# Patient Record
Sex: FEMALE | Race: WHITE | HISPANIC OR LATINO | Employment: FULL TIME
[De-identification: names, ages, dates, MRNs, and addresses within clinical notes are randomized per-mention and may not be internally consistent; named-entity substitution may affect disease eponyms.]

---

## 2017-07-15 ENCOUNTER — HEALTH MAINTENANCE LETTER (OUTPATIENT)
Age: 32
End: 2017-07-15

## 2021-05-25 ENCOUNTER — HOSPITAL ENCOUNTER (EMERGENCY)
Facility: CLINIC | Age: 36
Discharge: HOME | End: 2021-05-25
Attending: EMERGENCY MEDICINE | Admitting: EMERGENCY MEDICINE
Payer: MEDICARE

## 2021-05-25 VITALS
HEART RATE: 113 BPM | OXYGEN SATURATION: 96 % | BODY MASS INDEX: 44.15 KG/M2 | SYSTOLIC BLOOD PRESSURE: 125 MMHG | HEIGHT: 65 IN | DIASTOLIC BLOOD PRESSURE: 85 MMHG | WEIGHT: 265 LBS | TEMPERATURE: 98.9 F | RESPIRATION RATE: 16 BRPM

## 2021-05-25 DIAGNOSIS — F41.9 ANXIETY: ICD-10-CM

## 2021-05-25 DIAGNOSIS — R00.0 SINUS TACHYCARDIA: ICD-10-CM

## 2021-05-25 LAB
ANION GAP SERPL CALCULATED.3IONS-SCNC: 4 MMOL/L (ref 3–14)
BASOPHILS # BLD AUTO: 0.1 10E9/L (ref 0–0.2)
BASOPHILS NFR BLD AUTO: 0.4 %
BUN SERPL-MCNC: 12 MG/DL (ref 7–30)
CALCIUM SERPL-MCNC: 9.3 MG/DL (ref 8.5–10.1)
CHLORIDE SERPL-SCNC: 110 MMOL/L (ref 94–109)
CO2 SERPL-SCNC: 25 MMOL/L (ref 20–32)
CREAT SERPL-MCNC: 0.7 MG/DL (ref 0.52–1.04)
D DIMER PPP FEU-MCNC: <0.3 UG/ML FEU (ref 0–0.5)
DIFFERENTIAL METHOD BLD: ABNORMAL
EOSINOPHIL # BLD AUTO: 0.2 10E9/L (ref 0–0.7)
EOSINOPHIL NFR BLD AUTO: 1.3 %
ERYTHROCYTE [DISTWIDTH] IN BLOOD BY AUTOMATED COUNT: 14.2 % (ref 10–15)
GFR SERPL CREATININE-BSD FRML MDRD: >90 ML/MIN/{1.73_M2}
GLUCOSE SERPL-MCNC: 114 MG/DL (ref 70–99)
HCG SERPL QL: NEGATIVE
HCT VFR BLD AUTO: 41.5 % (ref 35–47)
HGB BLD-MCNC: 13.2 G/DL (ref 11.7–15.7)
IMM GRANULOCYTES # BLD: 0.1 10E9/L (ref 0–0.4)
IMM GRANULOCYTES NFR BLD: 0.4 %
INTERPRETATION ECG - MUSE: NORMAL
LYMPHOCYTES # BLD AUTO: 3.1 10E9/L (ref 0.8–5.3)
LYMPHOCYTES NFR BLD AUTO: 24.3 %
MAGNESIUM SERPL-MCNC: 2.2 MG/DL (ref 1.6–2.3)
MCH RBC QN AUTO: 23.9 PG (ref 26.5–33)
MCHC RBC AUTO-ENTMCNC: 31.8 G/DL (ref 31.5–36.5)
MCV RBC AUTO: 75 FL (ref 78–100)
MONOCYTES # BLD AUTO: 0.7 10E9/L (ref 0–1.3)
MONOCYTES NFR BLD AUTO: 5.3 %
NEUTROPHILS # BLD AUTO: 8.7 10E9/L (ref 1.6–8.3)
NEUTROPHILS NFR BLD AUTO: 68.3 %
NRBC # BLD AUTO: 0 10*3/UL
NRBC BLD AUTO-RTO: 0 /100
PLATELET # BLD AUTO: 409 10E9/L (ref 150–450)
POTASSIUM SERPL-SCNC: 4.2 MMOL/L (ref 3.4–5.3)
RBC # BLD AUTO: 5.52 10E12/L (ref 3.8–5.2)
SODIUM SERPL-SCNC: 139 MMOL/L (ref 133–144)
TROPONIN I SERPL-MCNC: <0.015 UG/L (ref 0–0.04)
TSH SERPL DL<=0.005 MIU/L-ACNC: 1.24 MU/L (ref 0.4–4)
WBC # BLD AUTO: 12.7 10E9/L (ref 4–11)

## 2021-05-25 PROCEDURE — 84443 ASSAY THYROID STIM HORMONE: CPT | Performed by: EMERGENCY MEDICINE

## 2021-05-25 PROCEDURE — 93005 ELECTROCARDIOGRAM TRACING: CPT

## 2021-05-25 PROCEDURE — 83735 ASSAY OF MAGNESIUM: CPT | Performed by: EMERGENCY MEDICINE

## 2021-05-25 PROCEDURE — 99285 EMERGENCY DEPT VISIT HI MDM: CPT | Mod: 25

## 2021-05-25 PROCEDURE — 84484 ASSAY OF TROPONIN QUANT: CPT | Performed by: EMERGENCY MEDICINE

## 2021-05-25 PROCEDURE — 96360 HYDRATION IV INFUSION INIT: CPT

## 2021-05-25 PROCEDURE — 85379 FIBRIN DEGRADATION QUANT: CPT | Performed by: EMERGENCY MEDICINE

## 2021-05-25 PROCEDURE — 96361 HYDRATE IV INFUSION ADD-ON: CPT

## 2021-05-25 PROCEDURE — 258N000003 HC RX IP 258 OP 636: Performed by: EMERGENCY MEDICINE

## 2021-05-25 PROCEDURE — 84703 CHORIONIC GONADOTROPIN ASSAY: CPT | Performed by: EMERGENCY MEDICINE

## 2021-05-25 PROCEDURE — 85025 COMPLETE CBC W/AUTO DIFF WBC: CPT | Performed by: EMERGENCY MEDICINE

## 2021-05-25 PROCEDURE — 80048 BASIC METABOLIC PNL TOTAL CA: CPT | Performed by: EMERGENCY MEDICINE

## 2021-05-25 PROCEDURE — 250N000013 HC RX MED GY IP 250 OP 250 PS 637: Performed by: EMERGENCY MEDICINE

## 2021-05-25 RX ORDER — HYDROXYZINE HYDROCHLORIDE 25 MG/1
25 TABLET, FILM COATED ORAL ONCE
Status: DISCONTINUED | OUTPATIENT
Start: 2021-05-25 | End: 2021-05-25 | Stop reason: HOSPADM

## 2021-05-25 RX ADMIN — SODIUM CHLORIDE 1000 ML: 9 INJECTION, SOLUTION INTRAVENOUS at 12:01

## 2021-05-25 ASSESSMENT — MIFFLIN-ST. JEOR: SCORE: 1907.91

## 2021-05-25 ASSESSMENT — ENCOUNTER SYMPTOMS
NERVOUS/ANXIOUS: 1
PALPITATIONS: 1
SHORTNESS OF BREATH: 0

## 2021-05-25 NOTE — ED PROVIDER NOTES
"  History   Chief Complaint:  \"Spasms\"    The history is provided by the patient.      Linda Park is a 35 year old female with history of anxiety who presents with spasms. Linda developed a tight sensation and \"spasms\" in her left upper and lower extremities this morning. She felt anxious with palpitations. She describes the spasms as \"twitchig\" which has resolved by arrival. She believes she was hyperventilating but did not feel short of breath nor did she have chest pain. Her symptoms were similar, but more severe, to those she has had in the past with panic attacks. She does have a psychiatrist and a counselor and does not feel she has had worsening stressors or depression that would have led to her symptoms today. She denies SI, ingestion, trauma, travel, or recent surgery. Linda denies family history of medical problems.    Review of Systems   Respiratory: Negative for shortness of breath.    Cardiovascular: Positive for palpitations. Negative for chest pain.   Neurological: Negative for weakness.        Positive for tightness and spasms, left arm and left leg.   Psychiatric/Behavioral: Negative for suicidal ideas. The patient is nervous/anxious.    All other systems reviewed and are negative.    Allergies:  The patient has no known allergies.     Medications:  Zyrtec  Hydroxyzine  Fluvoxamine  Naproxen  Birth control patch, unknown    Past Medical History:    Anxiety  ADD  Back pain  Obesity  Learning disability     Past Surgical History:    There is no past surgical history to display for this patient.     Family History:    Patient is adopted. Family history is unknown.    Social History:  Works at [employer] but is on [employer].  Denies drinking alcohol or smoking.  Denies use of drugs including no marijuana.  Presents alone via EMS.  Vaccinated for COVID-19    Physical Exam     Patient Vitals for the past 24 hrs:   BP Temp Temp src Pulse Resp SpO2 Height Weight   05/25/21 1424 125/85 -- -- " "113 16 96 % -- --   05/25/21 1200 (!) 133/95 -- -- 101 16 96 % -- --   05/25/21 1114 (!) 134/93 98.9  F (37.2  C) Oral 100 16 97 % 1.651 m (5' 5\") 120.2 kg (265 lb)       Physical Exam  General: Well-developed and well-nourished. Well appearing young  woman. Cooperative.  Head:  Atraumatic.  Eyes:  Conjunctivae, lids, and sclerae are normal.  Neck:  Supple. Normal range of motion.  CV:  Tachycardic rate and regular rhythm. Normal heart sounds with no murmurs, rubs, or gallops detected.  Resp:  No respiratory distress. Clear to auscultation bilaterally without decreased breath sounds, wheezing, rales, or rhonchi.  GI:  Soft. Non-distended. Non-tender.    MS:  Normal ROM. No bilateral lower extremity edema or calf tenderness.  Skin:  Warm. Non-diaphoretic. No pallor.  Neuro: Awake. A&Ox3. Normal strength.  Normal muscle tone.  No spasm, tremor, or twitching.  Psych:  Flat mood and affect. Normal speech.  No suicidal thought content.  Not responding to internal stimuli.  Vitals reviewed.    Emergency Department Course   EKG  Time: 1221  Rate 97 bpm. AK interval 152. QRS duration 74. QT/QTc 360/457.   Normal sinus rhythm  Junctional ST depression, probably normal  Borderline ECG   No acute ST changes.  No significant change as compared to prior, dated 1/1/16.    Laboratory:  CBC: WBC: 12.7 (H), HGB: 13.2, PLT: 409  BMP: Glucose 114 (H), Chloride: 110 (H), o/w WNL (Creatinine: 0.70)  Troponin (Collected 1159): <0.015  D-dimer: <0.3  TSH with Free T4 Reflex: 1.24  Magnesium: 2.2  HCG Qualitative Urine: negative     Emergency Department Course:    Reviewed:  I reviewed nursing notes, vitals, past medical history, and Care Everywhere.    Assessments:  1145 I obtained history and examined the patient as noted above.   1311 I rechecked the patient and explained findings. She declines transfer to [facility] and is feeling improved with no concerns.    Interventions:  1201 NS 1000 mL IV  Declined " "hydroxyzine.    Disposition:  The patient was discharged home.   Impression & Plan   Medical Decision Making:  Linad is a 35-year-old woman who tells me she had a tight feeling in her left upper and lower extremity with palpitations and anxiety this morning which prompted her visit.  She felt her left side was \"spasming\" which she describes as a twitching sensation.  She feels she was hyperventilating but did not feel short of breath.  She denies chest pain or other concerns.  The time of interview she is feeling much improved aside from some residual anxiety.  She denies suicidal thought content.  She appears well on exam with no focal findings.  She has no involuntary movements including spasm or tremor.  She is tachycardic which may be related to her anxiety.  However, she declined hydroxyzine.  Her D-dimer is negative essentially ruling out pulmonary embolism and EKG reveals no concerning features.  Troponin is undetectable and, particularly without chest pain or shortness of breath, a cardiac etiology for her symptoms is unlikely.  There is no anemia, kidney injury, or significant electrolyte derangements to explain her symptoms.  TSH is normal.  Leukocytosis to 12.7 is of uncertain etiology.  Linda was given IV fluids while undergoing her work-up but required no further interventions.  Multiple times I offered to send her to the [facility] unit as symptoms today are most consistent with a panic attack.  However, she declined this offer and preferred to go home as she was feeling much better.  I think this is a reasonable course of action as she has no current complaints, has unremarkable exam and work-up, and has no suicidal thought content.  I recommended she continue her home medications for anxiety and follow with her mental health providers.  She understands she should return if she is having worsening symptoms or if she changes her mind regarding a mental health assessment.  All questions " answered.    Diagnosis:    ICD-10-CM    1. Anxiety  F41.9    2. Sinus tachycardia  R00.0        Scribe Disclosure:  I, Juan Rodriguez, am serving as a scribe at 11:28 AM on 5/25/2021 to document services personally performed by Eden Thompson MD based on my observations and the provider's statements to me.      Eden Thompson MD  05/26/21 [id number]

## 2021-05-25 NOTE — ED NOTES
Patient states that she is safe at home. States that she is still not interested in [facility] and will return to the ED if she needs to.

## 2021-05-25 NOTE — ED TRIAGE NOTES
Per EMS: Patient lives alone with her cat. States that yesterday she started having left leg tingling and anxiety. Did not eat yesterday but has been drinking pop. EMS stated that she calms when he spoke with her but as soon as he stopped, she started trembling and shaking again. Received 2.5 of droperidol in route.

## 2021-05-25 NOTE — ED NOTES
Bed: ED18  Expected date:   Expected time:   Means of arrival:   Comments:  [facility] – [place] - [phone] - 35 F anxiety eta 1108

## 2021-05-26 ASSESSMENT — ENCOUNTER SYMPTOMS: WEAKNESS: 0

## 2022-07-30 ENCOUNTER — NURSE TRIAGE (OUTPATIENT)
Dept: NURSING | Facility: CLINIC | Age: 37
End: 2022-07-30

## 2022-07-30 NOTE — TELEPHONE ENCOUNTER
"Incoming red transfer call     Linda states her symptoms started mid-morning yesterday. \"I'm downing a lot of water. I still feel like crap.\" Current symptoms include headache (rating pain 6-7/10), diarrhea 2-3 times overnight, and generally not feeling well. Linda has taken Advil, but now with consistency and still continues to have a headache. Has been urinating every 2-3 hours and urinated this morning. Patient denies severe abdominal pain, blood in the stool, or fever (doesn't feel flushed/ warm/ chilled). Otherwise, Linda is awake, alert, and responding appropriately.     Triage guidelines recommend home care at this time. FNA RN reviewed home care advice and advised to       RN advised to call back with any changes, worsening of symptoms, and questions or concerns. Patient verbalized understanding of and agreement with plan and had no further questions.     Reason for Disposition    MILD-MODERATE diarrhea (e.g., 1-6 times / day more than normal)    Additional Information    Negative: Shock suspected (e.g., cold/pale/clammy skin, too weak to stand, low BP, rapid pulse)    Negative: Difficult to awaken or acting confused (e.g., disoriented, slurred speech)    Negative: Sounds like a life-threatening emergency to the triager    Negative: [1] SEVERE abdominal pain (e.g., excruciating) AND [2] present > 1 hour    Negative: [1] SEVERE abdominal pain AND [2] age > 60    Negative: [1] Blood in the stool AND [2] moderate or large amount of blood    Negative: Black or tarry bowel movements  (Exception: chronic-unchanged  black-grey bowel movements AND is taking iron pills or Pepto-bismol)    Negative: [1] Drinking very little AND [2] dehydration suspected (e.g., no urine > 12 hours, very dry mouth, very lightheaded)    Negative: Patient sounds very sick or weak to the triager    Negative: [1] SEVERE diarrhea (e.g., 7 or more times / day more than normal) AND [2] present > 24 hours (1 day)    Negative: [1] " MODERATE diarrhea (e.g., 4-6 times / day more than normal) AND [2] present > 48 hours (2 days)    Negative: [1] MODERATE diarrhea (e.g., 4-6 times / day more than normal) AND [2] age > 70 years    Negative: Fever > 101 F (38.3 C)    Negative: Fever present > 3 days (72 hours)    Negative: Abdominal pain  (Exception: Pain clears with each passage of diarrhea stool)    Negative: [1] Blood in the stool AND [2] small amount of blood   (Exception: only on toilet paper. Reason: diarrhea can cause rectal irritation with blood on wiping)    Negative: [1] Mucus or pus in stool AND [2] present > 2 days AND [3] diarrhea is more than mild    Negative: [1] Recent antibiotic therapy (i.e., within last 2 months) AND [2] diarrhea present > 3 days since antibiotic was stopped    Negative: [1] Recent hospitalization AND [2] diarrhea present > 3 days    Negative: Weak immune system (e.g., HIV positive, cancer chemo, splenectomy, organ transplant, chronic steroids)    Negative: Tube feedings (e.g., nasogastric, g-tube, j-tube)    Negative: Travel to a foreign country in past month    Negative: [1] MILD diarrhea (e.g., 1-3 or more stools than normal in past 24 hours) without known cause AND [2] present >  7 days    Negative: Diarrhea is a chronic symptom (recurrent or ongoing AND present > 4 weeks)    Negative: SEVERE diarrhea (e.g., 7 or more times / day more than normal)    Protocols used: DIARRHEA-A-    Meche Urban RN-BSN  [facility]

## 2024-07-28 ENCOUNTER — OFFICE VISIT (OUTPATIENT)
Dept: FAMILY MEDICINE | Facility: CLINIC | Age: 39
End: 2024-07-28
Payer: MEDICARE

## 2024-07-28 VITALS
HEART RATE: 93 BPM | OXYGEN SATURATION: 96 % | DIASTOLIC BLOOD PRESSURE: 90 MMHG | TEMPERATURE: 97.5 F | SYSTOLIC BLOOD PRESSURE: 127 MMHG

## 2024-07-28 DIAGNOSIS — H60.503 ACUTE OTITIS EXTERNA OF BOTH EARS, UNSPECIFIED TYPE: Primary | ICD-10-CM

## 2024-07-28 PROCEDURE — 99203 OFFICE O/P NEW LOW 30 MIN: CPT

## 2024-07-28 RX ORDER — MELOXICAM 15 MG/1
TABLET ORAL
COMMUNITY
Start: 2024-07-05

## 2024-07-28 RX ORDER — FLUOXETINE 40 MG/1
1 CAPSULE ORAL DAILY
COMMUNITY
Start: 2024-07-15

## 2024-07-28 RX ORDER — KETOCONAZOLE 20 MG/ML
SHAMPOO TOPICAL
COMMUNITY
Start: 2024-05-30

## 2024-07-28 RX ORDER — KETOCONAZOLE 20 MG/G
CREAM TOPICAL
COMMUNITY
Start: 2024-05-30

## 2024-07-28 RX ORDER — FAMOTIDINE 20 MG/1
20 TABLET, FILM COATED ORAL
COMMUNITY
Start: 2024-03-15

## 2024-07-28 RX ORDER — OFLOXACIN 3 MG/ML
10 SOLUTION AURICULAR (OTIC) DAILY
Qty: 10 ML | Refills: 0 | Status: SHIPPED | OUTPATIENT
Start: 2024-07-28

## 2024-07-28 RX ORDER — CYCLOBENZAPRINE HCL 10 MG
10 TABLET ORAL
COMMUNITY
Start: 2024-07-03

## 2024-07-28 NOTE — PROGRESS NOTES
Assessment & Plan      1. Acute otitis externa of both ears, unspecified type    - ofloxacin (FLOXIN) 0.3 % otic solution; Place 10 drops into both ears daily  Dispense: 10 mL; Refill: 0      Patient Instructions   Keep ears dry  Follow up with PCP for further evaluation  PCP will do an ENT referral     Return if symptoms worsen or fail to improve, for Follow up.    At the end of the encounter, I discussed results, diagnosis, medications. Discussed red flags for immediate return to clinic/ER, as well as indications for follow up if no improvement. Patient understood and agreed to plan. Patient was stable for discharge.    Vladimir Mckeon is a 39 year old female who presents to clinic today for the following health issues:  Chief Complaint   Patient presents with    Urgent Care     Ear pain, left x couple weeks  - hx of ear fungal infection       HPI    Patient reports left ear discomfort, itching for 2 weeks. Patient has a history of otitis externa. She was last treated for otitis externa in 2/2024 with ofloxacin daily for 10 days. She had ear culture done in 01/2024 which showed infection with Corynebacterium amycolatum. Patient denies fever, chills, cold symptoms.     Review of Systems   HENT:  Positive for ear pain.    All other systems reviewed and are negative.      Problem List:  There are no relevant problems documented for this patient.      Past Medical History:   Diagnosis Date    Anxiety        Social History     Tobacco Use    Smoking status: Never    Smokeless tobacco: Never   Substance Use Topics    Alcohol use: No           Objective    BP (!) 127/90   Pulse 93   Temp 97.5  F (36.4  C) (Tympanic)   SpO2 96%   Physical Exam  Constitutional:       Appearance: Normal appearance.   HENT:      Head: Normocephalic.      Right Ear: Tympanic membrane normal.      Left Ear: Tympanic membrane normal.      Ears:      Comments: Bilateral ear canal redness and discharge.   Cardiovascular:      Rate and  Rhythm: Normal rate and regular rhythm.   Pulmonary:      Effort: Pulmonary effort is normal.      Breath sounds: Normal breath sounds.   Lymphadenopathy:      Head:      Right side of head: No preauricular or posterior auricular adenopathy.      Left side of head: No preauricular or posterior auricular adenopathy.      Cervical: No cervical adenopathy.      Right cervical: No superficial cervical adenopathy.     Left cervical: No superficial cervical adenopathy.   Skin:     General: Skin is warm and dry.      Findings: No rash.   Neurological:      Mental Status: She is alert.   Psychiatric:         Mood and Affect: Mood normal.         Behavior: Behavior normal.              Keysha Mahmood PA-C